# Patient Record
Sex: MALE | Race: BLACK OR AFRICAN AMERICAN | NOT HISPANIC OR LATINO | URBAN - METROPOLITAN AREA
[De-identification: names, ages, dates, MRNs, and addresses within clinical notes are randomized per-mention and may not be internally consistent; named-entity substitution may affect disease eponyms.]

---

## 2019-05-03 ENCOUNTER — EMERGENCY (EMERGENCY)
Facility: HOSPITAL | Age: 44
LOS: 1 days | Discharge: HOME | End: 2019-05-03
Attending: EMERGENCY MEDICINE | Admitting: EMERGENCY MEDICINE
Payer: COMMERCIAL

## 2019-05-03 VITALS
SYSTOLIC BLOOD PRESSURE: 161 MMHG | TEMPERATURE: 99 F | HEART RATE: 100 BPM | OXYGEN SATURATION: 95 % | DIASTOLIC BLOOD PRESSURE: 100 MMHG | RESPIRATION RATE: 15 BRPM

## 2019-05-03 VITALS
RESPIRATION RATE: 18 BRPM | DIASTOLIC BLOOD PRESSURE: 100 MMHG | OXYGEN SATURATION: 99 % | HEART RATE: 90 BPM | SYSTOLIC BLOOD PRESSURE: 155 MMHG | TEMPERATURE: 98 F

## 2019-05-03 PROCEDURE — 99284 EMERGENCY DEPT VISIT MOD MDM: CPT | Mod: 25

## 2019-05-03 RX ORDER — METOCLOPRAMIDE HCL 10 MG
10 TABLET ORAL ONCE
Qty: 0 | Refills: 0 | Status: COMPLETED | OUTPATIENT
Start: 2019-05-03 | End: 2019-05-03

## 2019-05-03 RX ORDER — ONDANSETRON 8 MG/1
4 TABLET, FILM COATED ORAL ONCE
Qty: 0 | Refills: 0 | Status: COMPLETED | OUTPATIENT
Start: 2019-05-03 | End: 2019-05-03

## 2019-05-03 RX ORDER — ACETAMINOPHEN 500 MG
650 TABLET ORAL ONCE
Qty: 0 | Refills: 0 | Status: COMPLETED | OUTPATIENT
Start: 2019-05-03 | End: 2019-05-03

## 2019-05-03 RX ORDER — SODIUM CHLORIDE 9 MG/ML
1000 INJECTION INTRAMUSCULAR; INTRAVENOUS; SUBCUTANEOUS ONCE
Qty: 0 | Refills: 0 | Status: COMPLETED | OUTPATIENT
Start: 2019-05-03 | End: 2019-05-03

## 2019-05-03 RX ORDER — VALACYCLOVIR 500 MG/1
1000 TABLET, FILM COATED ORAL ONCE
Qty: 0 | Refills: 0 | Status: COMPLETED | OUTPATIENT
Start: 2019-05-03 | End: 2019-05-03

## 2019-05-03 RX ORDER — VALACYCLOVIR 500 MG/1
1 TABLET, FILM COATED ORAL
Qty: 21 | Refills: 0 | OUTPATIENT
Start: 2019-05-03 | End: 2019-05-09

## 2019-05-03 RX ADMIN — SODIUM CHLORIDE 1000 MILLILITER(S): 9 INJECTION INTRAMUSCULAR; INTRAVENOUS; SUBCUTANEOUS at 05:13

## 2019-05-03 RX ADMIN — VALACYCLOVIR 1000 MILLIGRAM(S): 500 TABLET, FILM COATED ORAL at 05:43

## 2019-05-03 RX ADMIN — Medication 650 MILLIGRAM(S): at 05:12

## 2019-05-03 RX ADMIN — ONDANSETRON 4 MILLIGRAM(S): 8 TABLET, FILM COATED ORAL at 05:43

## 2019-05-03 RX ADMIN — Medication 10 MILLIGRAM(S): at 05:12

## 2019-05-03 RX ADMIN — Medication 60 MILLIGRAM(S): at 05:12

## 2019-05-03 NOTE — ED PROVIDER NOTE - OBJECTIVE STATEMENT
44M, h.o bell's, DM (metformin, lantus), p.w acute left facial numbness and inability to close his left eye and keep in secretions. Pt has associated left focal postauricular pain and nausea. Denied vomiting, fever, head trauma, hearing lost. numbness and tingling.  Pt has similar symptoms in the past and treated with prednisone. no travel hx, drug use.

## 2019-05-03 NOTE — ED ADULT NURSE NOTE - NSIMPLEMENTINTERV_GEN_ALL_ED
Implemented All Universal Safety Interventions:  [place]ls[place] to call system. Call bell, personal items and telephone within reach. Instruct patient to call for assistance. Room bathroom lighting operational. Non-slip footwear when patient is off stretcher. Physically safe environment: no spills, clutter or unnecessary equipment. Stretcher in lowest position, wheels locked, appropriate side rails in place.

## 2019-05-03 NOTE — ED ADULT TRIAGE NOTE - CHIEF COMPLAINT QUOTE
alert c/o left side headache  feels like he cant close left eye or hold fluid in mouth no c/o weakness   c/o nausea s/s began 1100   hx DM   bells palsy      LOTTIE called for stroke eval alert c/o left side headache  feels like he cant close left eye or hold fluid in mouth no c/o weakness   c/o nausea s/s began 1100   hx DM   bells palsy      LOTTIE called for stroke eval.  Patient eval by [facility], no code stroke.

## 2019-05-03 NOTE — ED ADULT NURSE NOTE - OBJECTIVE STATEMENT
43 yo M AAO4 received to room 10 c/o left sided facial weakness x 2 days, pt 45 yo M AAO4 received to room 10 c/o left sided facial weakness x 2 days, pt states he feels hes unable to close his left eye, sensation and motor function equally intact to both sides of the face, no code stroke at this time, pt denies CP/SOB/blurry vision, no slurred speech upon assessment, c/o "strong" left sided HA, pmhx DM & bells palsy, MD aware of FS, 20G placed in LAC, medicated as per order, will monitor

## 2019-05-03 NOTE — ED PROVIDER NOTE - NS ED ROS FT
GENERAL: No fever or chills, weight changes, nightsweats  EYES: no change in vision  HEENT: no dysplasia, odynophagia, ear pain, rhinorrhea, epistasis   CARDIAC: no chest pain, palpitation   PULMONARY: no cough or SOB  GI: no abdominal pain, no nausea or no vomiting, no diarrhea or constipation  : No changes in urination for pain/freq.   SKIN: no rashes   NEURO: HPI   MSK: No joint pain

## 2019-05-03 NOTE — ED ADULT NURSE NOTE - CHIEF COMPLAINT QUOTE
alert c/o left side headache  feels like he cant close left eye or hold fluid in mouth no c/o weakness   c/o nausea s/s began 1100   hx DM   bells palsy      LOTTIE called for stroke eval.  Patient eval by [facility], no code stroke.

## 2019-05-03 NOTE — ED PROVIDER NOTE - NSFOLLOWUPINSTRUCTIONS_ED_ALL_ED_FT
Thank you for visiting our Emergency Department, it has been a pleasure taking part in your healthcare.    Your discharge diagnosis is: Bell's palsy  Please take prednisone 60mg one a day for 1 week and valacyclovir      A copy of resulted labs, imaging, and findings have been provided to you.   You have had a detailed discussion with your provider regarding your diagnosis, management and discharge plan. You have been given the opportunity to have your questions answered. At this time you have been deemed stable and fit for discharge.    Return precautions to the Emergency Department include but are not limited to: unrelenting nausea, vomiting, fever, chills, chest pain, shortness of breath, dizziness, abdominal pain, worsening pain, syncope, blood in urine or stool, headache that doesn't resolve, numbness or tingling, loss of sensation, loss of motor function, or any other concerning symptoms. Thank you for visiting our Emergency Department, it has been a pleasure taking part in your healthcare.    Your discharge diagnosis is: Bell's palsy  Please take prednisone 60mg one a day for 1 week and valacyclovir 3x a day for 7 days. Please follow-up with your primary care doctor and your neurologist. Use the eye tear drops every 2 hrs or as needed. Check your blood glucose for possible elevation with prednisone.

## 2019-05-03 NOTE — ED PROVIDER NOTE - ATTENDING CONTRIBUTION TO CARE
HPI: 44M, history of bells palsy, DM (metformin, lantus), presents with acute left facial numbness and inability to close his left eye and keep in secretions. Pt has associated left focal postauricular pain and nausea. Denied vomiting, fever, head trauma, hearing lost. numbness and tingling.  Pt has similar symptoms in the past and treated with prednisone 15 years ago. no travel hx, drug use.  EXAM: Left facial droop without ability to furrow brow, inability to completely close left eye, EOMI/PERRL, TM normal bilaterally. No other focal deficits.  strength 5/5, BLE/BUE 5/5 strength.   MDM: concern for [place], unlikely CVA given findings and history and age and no risk factors. EOMI. Will provide meds including PRed, Valacylovir and eye drops and f/u with PMD and neuro outpt. Pt has neur already for chronic back pain that he can follow up with. No need for CT or labs.

## 2019-05-03 NOTE — ED PROVIDER NOTE - PHYSICAL EXAMINATION
General: NAD, good hygiene, well developed  HENT: Atraumatic, PERRLA, EMOI, mild ptosis of left, no conjunctivae injection, no post. oropharynx erythema, exudates, TM intact, no ulcers,   Neck: normal ROM and trachea midline   Cardiovascular: RRR, S1&2, no M or R, radial pulses equal and b/l  Respiratory: CTABL, no wheezes or crackles, no decreased breath sounds  Abdominal:  soft and non-tender non distended, neg CVA tenderness   Extremities: no edema of the legs/feet, DP/PT equal b/l  Skin: warm, well perfused  Neurologic: asymmetric of the face w. left upper and lower facial drooping, AAOx3, Cn2-12 grossly intact, strength +5 and sensation equal and b/l   Psych: normal mood and affect